# Patient Record
Sex: MALE | Employment: UNEMPLOYED | URBAN - METROPOLITAN AREA
[De-identification: names, ages, dates, MRNs, and addresses within clinical notes are randomized per-mention and may not be internally consistent; named-entity substitution may affect disease eponyms.]

---

## 2018-08-15 ENCOUNTER — TELEMEDICINE2 (OUTPATIENT)
Dept: ENDOCRINOLOGY | Facility: MEDICAL CENTER | Age: 38
End: 2018-08-15
Payer: COMMERCIAL

## 2018-08-15 VITALS
BODY MASS INDEX: 26.48 KG/M2 | OXYGEN SATURATION: 100 % | WEIGHT: 185 LBS | HEIGHT: 70 IN | TEMPERATURE: 98.6 F | SYSTOLIC BLOOD PRESSURE: 130 MMHG | RESPIRATION RATE: 16 BRPM | HEART RATE: 83 BPM | DIASTOLIC BLOOD PRESSURE: 77 MMHG

## 2018-08-15 DIAGNOSIS — F64.9 GENDER IDENTITY DISORDER: ICD-10-CM

## 2018-08-15 PROCEDURE — 99203 OFFICE O/P NEW LOW 30 MIN: CPT | Mod: GT | Performed by: INTERNAL MEDICINE

## 2018-08-15 RX ORDER — DORZOLAMIDE HCL 20 MG/ML
1 SOLUTION/ DROPS OPHTHALMIC 3 TIMES DAILY
COMMUNITY

## 2018-08-15 RX ORDER — HYDROXYZINE PAMOATE 50 MG/1
50 CAPSULE ORAL 3 TIMES DAILY PRN
COMMUNITY

## 2018-08-15 RX ORDER — DOCUSATE SODIUM 100 MG/1
100 CAPSULE, LIQUID FILLED ORAL 2 TIMES DAILY
COMMUNITY

## 2018-08-15 RX ORDER — LATANOPROST 50 UG/ML
1 SOLUTION/ DROPS OPHTHALMIC NIGHTLY
COMMUNITY

## 2018-08-15 RX ORDER — TIMOLOL MALEATE 5 MG/ML
1 SOLUTION/ DROPS OPHTHALMIC 2 TIMES DAILY
COMMUNITY

## 2018-08-15 NOTE — PROGRESS NOTES
New Patient Consult Note  Primary care physician: Pcp Not In Computer    Reason for consult: Gender identity disorder.    HPI:  Maynor Ferrara is a 38 y.o. old patient who comes in today for evaluation of gender identity disorder (male to female).  He says that I am in the wrong body and he realized this when he was 5 years old.  He always wanted to be a female.  Is ready to get started on the hormone replacement therapy to allow this transformation to happen.  He denies any complaints.  His energy levels are good.    ROS:  Constitutional: No weight loss  Cardiac: No palpitations or racing heart  Resp: No shortness of breath  Neuro: No numbness or tinging in feet  Endo: No heat or cold intolerance, no polyuria or polydipsia  All other systems were reviewed and were negative.    Past Medical History:  There are no active problems to display for this patient.      Past Surgical History:  History reviewed. No pertinent surgical history.    Allergies:  Benztropine    Social History:  Social History     Social History   • Marital status: Unknown     Spouse name: N/A   • Number of children: N/A   • Years of education: N/A     Occupational History   • Not on file.     Social History Main Topics   • Smoking status: Never Smoker   • Smokeless tobacco: Never Used   • Alcohol use No   • Drug use: No   • Sexual activity: Yes     Partners: Male     Other Topics Concern   • Not on file     Social History Narrative   • No narrative on file       Family History:  Family History   Problem Relation Age of Onset   • No Known Problems Mother    • No Known Problems Father    • No Known Problems Brother        Medications:    Current Outpatient Prescriptions:   •  docusate sodium (COLACE) 100 MG Cap, Take 100 mg by mouth 2 times a day., Disp: , Rfl:   •  dorzolamide (TRUSOPT) 2 % Solution, Place 1 Drop in both eyes 3 times a day., Disp: , Rfl:   •  hydrOXYzine pamoate (VISTARIL) 50 MG Cap, Take 50 mg by mouth 3 times a day as needed  "for Itching., Disp: , Rfl:   •  latanoprost (XALATAN) 0.005 % Solution, Place 1 Drop in both eyes every evening., Disp: , Rfl:   •  LITHIUM PO, Take  by mouth., Disp: , Rfl:   •  timolol (TIMOPTIC) 0.5 % Solution, Place 1 Drop in both eyes 2 times a day., Disp: , Rfl:   •  levalbuterol (XOPENEX HFA) 45 MCG/ACT inhaler, Inhale 1-2 Puffs by mouth every four hours as needed for Shortness of Breath., Disp: , Rfl:     Labs: Reviewed    Physical Examination:  Vital signs: /77   Pulse 83   Temp 37 °C (98.6 °F)   Resp 16   Ht 1.778 m (5' 10\")   Wt 83.9 kg (185 lb)   SpO2 100%   BMI 26.54 kg/m²  Body mass index is 26.54 kg/m².  General: No apparent distress, cooperative  Eyes: No scleral icterus or discharge  ENMT: Normal on external inspection of nose, lips, normal thyroid exam  Neck: No abnormal masses on inspection  Resp: Normal effort, clear to auscultation bilaterally   CVS: Regular rate and rhythm, S1 S2 normal, no murmur   Extremities: No edema  Abdomen: mild abdominal obesity present  Neuro: Alert and oriented  Skin: No rash  Psych: Normal mood and affect, intact memory and able to make informed decisions    Assessment and Plan:    1. Gender identity disorder  Male to female, recommend starting 2 mg estradiol oral daily along with Spironolactone 100 mg twice daily.  Recommend to check serum potassium while on Spironolactone therapy.  And recommend to check liver enzymes while on estrogen therapy.    The above recommended labs can be done a week or 2 prior to the next appointment with me.    This consultation was conducted utilizing secure and encrypted videoconferencing equipment with the assistance of a trained tele-presenter at the originating site.    Return in about 3 months (around 11/15/2018).     Thank you for allowing me to participate in the care of this patient.    Lyle Dinh M.D.  08/15/18    CC:   Pcp Not In Computer    This note was created using voice recognition software (Dragon). " The accuracy of the dictation is limited by the abilities of the software. I have reviewed the note prior to signing, however some errors in grammar and context are still possible. If you have any questions related to this note please do not hesitate to contact our office.

## 2018-11-28 ENCOUNTER — TELEMEDICINE2 (OUTPATIENT)
Dept: ENDOCRINOLOGY | Facility: MEDICAL CENTER | Age: 38
End: 2018-11-28
Payer: COMMERCIAL

## 2018-11-28 VITALS
DIASTOLIC BLOOD PRESSURE: 85 MMHG | OXYGEN SATURATION: 97 % | HEIGHT: 71 IN | WEIGHT: 184 LBS | SYSTOLIC BLOOD PRESSURE: 127 MMHG | BODY MASS INDEX: 25.76 KG/M2 | HEART RATE: 92 BPM

## 2018-11-28 DIAGNOSIS — F64.9 GENDER IDENTITY DISORDER: ICD-10-CM

## 2018-11-28 DIAGNOSIS — Z79.899 HIGH RISK MEDICATIONS (NOT ANTICOAGULANTS) LONG-TERM USE: ICD-10-CM

## 2018-11-28 PROCEDURE — 99214 OFFICE O/P EST MOD 30 MIN: CPT | Mod: GT | Performed by: INTERNAL MEDICINE

## 2018-11-28 NOTE — PROGRESS NOTES
"Endocrinology Clinic Progress Note  PCP: Pcp Not In Computer    HPI:  Maynor Ferrara is a 38 y.o. old patient who comes in today for review of endocrine problems.  For gender identity disorder is currently on 2 mg estradiol daily along with Spironolactone 100 mg twice daily.  However he would like to transition to intramuscular estradiol injections.    ROS:  Constitutional: No unintentional weight loss  Endo: Denies excessive thirst or frequent urination  All other systems were reviewed and were negative.    Past Medical History:  There are no active problems to display for this patient.      Medications:    Current Outpatient Prescriptions:   •  docusate sodium (COLACE) 100 MG Cap, Take 100 mg by mouth 2 times a day., Disp: , Rfl:   •  dorzolamide (TRUSOPT) 2 % Solution, Place 1 Drop in both eyes 3 times a day., Disp: , Rfl:   •  hydrOXYzine pamoate (VISTARIL) 50 MG Cap, Take 50 mg by mouth 3 times a day as needed for Itching., Disp: , Rfl:   •  latanoprost (XALATAN) 0.005 % Solution, Place 1 Drop in both eyes every evening., Disp: , Rfl:   •  LITHIUM PO, Take  by mouth., Disp: , Rfl:   •  timolol (TIMOPTIC) 0.5 % Solution, Place 1 Drop in both eyes 2 times a day., Disp: , Rfl:   •  levalbuterol (XOPENEX HFA) 45 MCG/ACT inhaler, Inhale 1-2 Puffs by mouth every four hours as needed for Shortness of Breath., Disp: , Rfl:     Labs: Reviewed    Physical Examination:  Vital signs: /85 (BP Location: Left arm, Patient Position: Sitting)   Pulse 92   Ht 1.803 m (5' 11\")   Wt 83.5 kg (184 lb)   SpO2 97%   BMI 25.66 kg/m²  Body mass index is 25.66 kg/m².  General: No apparent distress, cooperative  Eyes: No scleral icterus, no discharge, normal eyelids  Neck: No abnormal masses on inspection, normal thyroid exam  Resp: Normal effort, clear to auscultation bilaterally  CVS: Regular rate and rhythm, S1 S2 normal, no murmur  Extremities: No lower extremity edema  Abdomen: abdominal obesity " present  Musculoskeletal: Normal digits and nails  Skin: No rash on visible skin  Psych: Alert and oriented, normal mood and affect, intact memory and able to make informed decisions.    Assessment and Plan:    1. Gender identity disorder  Recommend switching him to estradiol valerate intramuscular 10 mg injection every 2 weeks.  Recommend stopping oral estrogen completely.  Recommend continuing Spironolactone 100 mg twice daily.    Recommend starting a baby aspirin (81 mg daily dose) to reduce the risk of blood clots.    2. High risk medications (not anticoagulants) long-term use  Recommend checking CMP considering estradiol and Spironolactone use.    This consultation was conducted utilizing secure and encrypted videoconferencing equipment with the assistance of a trained tele-presenter at the originating site.    Return in about 4 months (around 3/28/2019).    Thank you for allowing me to participate in the care of this patient.    Lyle Dinh M.D.    CC:   Pcp Not In Computer    This note was created using voice recognition software (Dragon). The accuracy of the dictation is limited by the abilities of the software. I have reviewed the note prior to signing, however some errors in grammar and context are still possible. If you have any questions related to this note please do not hesitate to contact our office.